# Patient Record
Sex: FEMALE | Race: WHITE | Employment: FULL TIME | URBAN - METROPOLITAN AREA
[De-identification: names, ages, dates, MRNs, and addresses within clinical notes are randomized per-mention and may not be internally consistent; named-entity substitution may affect disease eponyms.]

---

## 2019-08-29 ENCOUNTER — AMBULATORY - HEALTHEAST (OUTPATIENT)
Dept: CARDIOLOGY | Facility: CLINIC | Age: 59
End: 2019-08-29

## 2021-01-29 ENCOUNTER — IMMUNIZATION (OUTPATIENT)
Dept: PEDIATRICS | Facility: CLINIC | Age: 61
End: 2021-01-29
Payer: COMMERCIAL

## 2021-01-29 PROCEDURE — 91300 PR COVID VAC PFIZER DIL RECON 30 MCG/0.3 ML IM: CPT

## 2021-01-29 PROCEDURE — 0001A PR COVID VAC PFIZER DIL RECON 30 MCG/0.3 ML IM: CPT

## 2021-02-19 ENCOUNTER — IMMUNIZATION (OUTPATIENT)
Dept: PEDIATRICS | Facility: CLINIC | Age: 61
End: 2021-02-19
Attending: INTERNAL MEDICINE
Payer: COMMERCIAL

## 2021-02-19 PROCEDURE — 91300 PR COVID VAC PFIZER DIL RECON 30 MCG/0.3 ML IM: CPT

## 2021-02-19 PROCEDURE — 0002A PR COVID VAC PFIZER DIL RECON 30 MCG/0.3 ML IM: CPT

## 2021-03-07 ENCOUNTER — HEALTH MAINTENANCE LETTER (OUTPATIENT)
Age: 61
End: 2021-03-07

## 2021-10-11 ENCOUNTER — HEALTH MAINTENANCE LETTER (OUTPATIENT)
Age: 61
End: 2021-10-11

## 2022-03-27 ENCOUNTER — HEALTH MAINTENANCE LETTER (OUTPATIENT)
Age: 62
End: 2022-03-27

## 2022-09-25 ENCOUNTER — HEALTH MAINTENANCE LETTER (OUTPATIENT)
Age: 62
End: 2022-09-25

## 2023-05-08 ENCOUNTER — HEALTH MAINTENANCE LETTER (OUTPATIENT)
Age: 63
End: 2023-05-08

## 2024-03-20 ENCOUNTER — TRANSCRIBE ORDERS (OUTPATIENT)
Dept: OTHER | Age: 64
End: 2024-03-20

## 2024-03-20 DIAGNOSIS — M75.00 ADHESIVE CAPSULITIS OF SHOULDER: Primary | ICD-10-CM

## 2024-03-20 DIAGNOSIS — M75.101 ROTATOR CUFF TEAR, RIGHT: ICD-10-CM

## 2024-04-18 ASSESSMENT — ACTIVITIES OF DAILY LIVING (ADL)
WHEN_LYING_ON_THE_INVOLVED_SIDE: 8
CARRYING_A_HEAVY_OBJECT_OF_10_POUNDS: 8
REMOVING_SOMETHING_FROM_YOUR_BACK_POCKET: 7
AT_ITS_WORST?: 9
PUTTING_ON_YOUR_PANTS: 0
PLEASE_INDICATE_YOR_PRIMARY_REASON_FOR_REFERRAL_TO_THERAPY:: SHOULDER
PUTTING_ON_AN_UNDERSHIRT_OR_A_PULLOVER_SWEATER: 7
PLACING_AN_OBJECT_ON_A_HIGH_SHELF: 10
WASHING_YOUR_BACK: 9
TOUCHING_THE_BACK_OF_YOUR_NECK: 1
PUSHING_WITH_THE_INVOLVED_ARM: 7
REACHING_FOR_SOMETHING_ON_A_HIGH_SHELF: 10
WASHING_YOUR_HAIR?: 8
PUTTING_ON_A_SHIRT_THAT_BUTTONS_DOWN_THE_FRONT: 7

## 2024-04-19 ENCOUNTER — THERAPY VISIT (OUTPATIENT)
Dept: PHYSICAL THERAPY | Facility: CLINIC | Age: 64
End: 2024-04-19
Payer: COMMERCIAL

## 2024-04-19 DIAGNOSIS — M75.00 ADHESIVE CAPSULITIS OF SHOULDER: ICD-10-CM

## 2024-04-19 DIAGNOSIS — M25.511 ACUTE PAIN OF RIGHT SHOULDER: Primary | ICD-10-CM

## 2024-04-19 DIAGNOSIS — M75.101 ROTATOR CUFF TEAR, RIGHT: ICD-10-CM

## 2024-04-19 PROCEDURE — 97110 THERAPEUTIC EXERCISES: CPT | Mod: GP | Performed by: PHYSICAL THERAPIST

## 2024-04-19 PROCEDURE — 97161 PT EVAL LOW COMPLEX 20 MIN: CPT | Mod: GP | Performed by: PHYSICAL THERAPIST

## 2024-04-19 NOTE — PROGRESS NOTES
PHYSICAL THERAPY EVALUATION  Type of Visit: Evaluation    See electronic medical record for Abuse and Falls Screening details.    Subjective       Presenting condition or subjective complaint: pt reports that she fell in January 19, 2024 and she hurt her shoulder with her arm outstretched. She went to see Dr. Hansen who recommended an MRI and wants her to go to PT for a frozen shoulder.  Date of onset:      Relevant medical history: Stroke; Heart problems; Menopause; Overweight; Pain at night or rest; Sleep disorder like apnea; Vision problems   Dates & types of surgery: hysterectomy in her 40's    Prior diagnostic imaging/testing results: MRI; X-ray; Other     Prior therapy history for the same diagnosis, illness or injury: Yes Jan 2024 after a fall on 1/19/24        Living Environment  Social support: With a significant other or spouse   Type of home: House   Stairs to enter the home: Yes 2 Is there a railing: No   Ramp: No   Stairs inside the home: Yes 21 Is there a railing: Yes   Help at home:    Equipment owned:       Employment: Yes Dental hygienist  Hobbies/Interests: Golf, gardening, pickle ball,hiking,fishing,cross country skiing,    Patient goals for therapy: Reach far, sleep, pain is always there, hold my grandchildren    Pain assessment: See objective evaluation for additional pain details     Objective   Pain:   Location: anterior, lateral, UT and goes down to the elbow and does get some burning into forearm  At rest: 5/10  With activity: 10/10  Quality: dull ache, sharp,   Frequency: Intermittent sharp and constant ache  Time of Day: worse in the night  Pain is exacerbated by: reaching overhead, behind back, lying on shoulder and lifting, cleaning, working.  Pain is relieved by: rest, ice, anti-inflammatories massage    Range of Motion:     AROM L shoulder: 165/168/T4/88     AROM R shoulder: 121/94/T10/54  Strength:  L shoulder: WNL    R shoulder: not tested due to pain with AROM today  Notable  mechanics: R scapular medial border prominence, winging and upward translation with abduction.   Special Tests: NT  Palpation: Tension and hypertonicity noted at R UT, LS, rhomboids  Posture: Fwd head, rounded shoulders and elevated scapula on R    Cervical repeated motions:   Repeated retraction: no effect during or after.           Repeated retraction w/ OP: no effect during, slight reduced pain in anterior shoulder and arm but felt more in chest and down her esophagus.    Repeated retraction w/ EXT: no effect during, and centralization out of R arm to shoulder and UT.        Assessment & Plan   CLINICAL IMPRESSIONS  Medical Diagnosis:      Treatment Diagnosis:     Impression/Assessment: Patient is a 64 year old female with R shoudler complaints.  The following significant findings have been identified: Pain, Decreased ROM/flexibility, Decreased joint mobility, and Decreased strength. These impairments interfere with their ability to perform self care tasks, work tasks, recreational activities, household chores, and driving  as compared to previous level of function.     Clinical Decision Making (Complexity):  Clinical Presentation: Stable/Uncomplicated  Clinical Presentation Rationale: based on medical and personal factors listed in PT evaluation  Clinical Decision Making (Complexity): Low complexity    PLAN OF CARE  Treatment Interventions:  Interventions: Manual Therapy, Neuromuscular Re-education, Therapeutic Activity, Therapeutic Exercise    Long Term Goals            Frequency of Treatment:    Duration of Treatment:      Recommended Referrals to Other Professionals: Physical Therapy  Education Assessment:        Risks and benefits of evaluation/treatment have been explained.   Patient/Family/caregiver agrees with Plan of Care.     Evaluation Time:             Signing Clinician: Lisseth Shaw, PT

## 2024-05-10 ENCOUNTER — THERAPY VISIT (OUTPATIENT)
Dept: PHYSICAL THERAPY | Facility: CLINIC | Age: 64
End: 2024-05-10
Payer: COMMERCIAL

## 2024-05-10 DIAGNOSIS — M25.511 ACUTE PAIN OF RIGHT SHOULDER: Primary | ICD-10-CM

## 2024-05-10 PROCEDURE — 97140 MANUAL THERAPY 1/> REGIONS: CPT | Mod: GP | Performed by: PHYSICAL THERAPIST

## 2024-05-10 PROCEDURE — 97110 THERAPEUTIC EXERCISES: CPT | Mod: GP | Performed by: PHYSICAL THERAPIST

## 2024-05-11 ENCOUNTER — HEALTH MAINTENANCE LETTER (OUTPATIENT)
Age: 64
End: 2024-05-11

## 2024-05-17 ENCOUNTER — THERAPY VISIT (OUTPATIENT)
Dept: PHYSICAL THERAPY | Facility: CLINIC | Age: 64
End: 2024-05-17
Payer: COMMERCIAL

## 2024-05-17 DIAGNOSIS — M25.511 ACUTE PAIN OF RIGHT SHOULDER: Primary | ICD-10-CM

## 2024-05-17 PROCEDURE — 97140 MANUAL THERAPY 1/> REGIONS: CPT | Mod: GP | Performed by: PHYSICAL THERAPIST

## 2024-05-17 PROCEDURE — 97110 THERAPEUTIC EXERCISES: CPT | Mod: GP | Performed by: PHYSICAL THERAPIST

## 2024-05-17 NOTE — PROGRESS NOTES
PLAN  Continue therapy per current plan of care.   05/17/24 0500   Appointment Info   Signing clinician's name / credentials Declan Gamez DPT   Total/Authorized Visits 12 ([facility])   Visits Used 3   Medical Diagnosis R shoulder adhesive capsulitis and RCT   PT Tx Diagnosis R shoulder pain and stiffness   Other pertinent information can't do an injection yet but she wants to wait until after her eye surgery.   Progress Note/Certification   Onset of illness/injury or Date of Surgery 01/19/24   Therapy Frequency 1x/wk for 4 weeks then every other week for 8 weeks   Predicted Duration 12 weeks   Progress Note Due Date 06/14/24   PT Goal 1   Goal Identifier reaching   Goal Description improve AROM and strength to allow pt to reach to top shelf of [place] cabinet without hiking shoulder   Rationale to maximize safety and independence within the home;to maximize safety and independence with performance of ADLs and functional tasks   Goal Progress see ROM section below   Target Date 07/12/24   Subjective Report   Subjective Report Neck has been sore/R side of neck. It's affecting her day, notes that she did not have neck pain prior to starting PT .The arm/shoulder overall is better, less severe pain and better movement. Overall about 20% improved since starting PT.   Objective Measures   Objective Measures Objective Measure 1   Objective Measure 1   Objective Measure R shoulder AAROM:   Details wall mpbjlzx=503, ABD=133, EXT=49   Treatment Interventions (PT)   Interventions Therapeutic Procedure/Exercise;Therapeutic Activity;Manual Therapy;Neuromuscular Re-education   Therapeutic Procedure/Exercise   Therapeutic Procedures: strength, endurance, ROM, flexibility minutes (73446) 25   PTRx Ther Proc 1 Cervical Retraction With Patient Overpressure   PTRx Ther Proc 1 - Details x10   PTRx Ther Proc 2 Seated Cervical Retraction Extension With Overpressure   PTRx Ther Proc 2 - Details 2-3 reps for review=too painful for work  "  PTRx Ther Proc 3 Pendulum/Codmans   PTRx Ther Proc 3 - Details No Notes   PTRx Ther Proc 4 Four Corner Stretch Flexion   PTRx Ther Proc 4 - Details 15   PTRx Ther Proc 5 Wand Shoulder Abduction Standing   PTRx Ther Proc 5 - Details 15   PTRx Ther Proc 6 Wand Shoulder Extension Standing   PTRx Ther Proc 6 - Details 15   PTRx Ther Proc 7 Four Corner Stretch External Rotation at Side   PTRx Ther Proc 7 - Details 15 x 5 sec multiple cues to hold for 5-10 sec   Skilled Intervention cuing for good form   Patient Response/Progress alfredito well, see above   Ther Proc 1 UBE warm up 4'   PTRx Ther Proc 8 Four Corner Stretch Internal Rotation   PTRx Ther Proc 8 - Details 15   Therapeutic Activity   Therapeutic Activities: dynamic activities to improve functional performance minutes (48992) 5   Manual Therapy   Manual Therapy: Mobilization, MFR, MLD, friction massage minutes (90647) 10   Manual Therapy 1 seated TFM to R UT   Patient Response/Progress felt better and was a little \"tickly\" after   Plan   Home program see ptrx   Updates to plan of care cervical retraction with self overpressure, education on improtance of sustaining the stretches for 5-10 sec   Plan for next session per resposne to cervical retraction, supine strength as able,   Total Session Time   Timed Code Treatment Minutes 40   Total Treatment Time (sum of timed and untimed services) 40         Beginning/End Dates of Progress Note Reporting Period:    to 05/17/2024    Referring Provider:  Everardo Hansen    "

## 2024-05-24 ENCOUNTER — THERAPY VISIT (OUTPATIENT)
Dept: PHYSICAL THERAPY | Facility: CLINIC | Age: 64
End: 2024-05-24
Payer: COMMERCIAL

## 2024-05-24 DIAGNOSIS — M25.511 ACUTE PAIN OF RIGHT SHOULDER: Primary | ICD-10-CM

## 2024-05-24 PROCEDURE — 97110 THERAPEUTIC EXERCISES: CPT | Mod: GP | Performed by: PHYSICAL THERAPIST

## 2024-05-24 PROCEDURE — 97140 MANUAL THERAPY 1/> REGIONS: CPT | Mod: GP | Performed by: PHYSICAL THERAPIST

## 2024-05-31 ENCOUNTER — THERAPY VISIT (OUTPATIENT)
Dept: PHYSICAL THERAPY | Facility: CLINIC | Age: 64
End: 2024-05-31
Payer: COMMERCIAL

## 2024-05-31 DIAGNOSIS — M25.511 ACUTE PAIN OF RIGHT SHOULDER: Primary | ICD-10-CM

## 2024-05-31 PROCEDURE — 97140 MANUAL THERAPY 1/> REGIONS: CPT | Mod: GP | Performed by: PHYSICAL THERAPIST

## 2024-05-31 PROCEDURE — 97110 THERAPEUTIC EXERCISES: CPT | Mod: GP | Performed by: PHYSICAL THERAPIST

## 2024-06-21 ENCOUNTER — THERAPY VISIT (OUTPATIENT)
Dept: PHYSICAL THERAPY | Facility: CLINIC | Age: 64
End: 2024-06-21
Payer: COMMERCIAL

## 2024-06-21 DIAGNOSIS — M25.511 ACUTE PAIN OF RIGHT SHOULDER: Primary | ICD-10-CM

## 2024-06-21 PROCEDURE — 97140 MANUAL THERAPY 1/> REGIONS: CPT | Mod: GP | Performed by: PHYSICAL THERAPIST

## 2024-06-21 PROCEDURE — 97110 THERAPEUTIC EXERCISES: CPT | Mod: GP | Performed by: PHYSICAL THERAPIST

## 2024-07-19 ENCOUNTER — THERAPY VISIT (OUTPATIENT)
Dept: PHYSICAL THERAPY | Facility: CLINIC | Age: 64
End: 2024-07-19
Payer: COMMERCIAL

## 2024-07-19 DIAGNOSIS — M25.511 ACUTE PAIN OF RIGHT SHOULDER: Primary | ICD-10-CM

## 2024-07-19 PROCEDURE — 97110 THERAPEUTIC EXERCISES: CPT | Mod: GP | Performed by: PHYSICAL THERAPIST

## 2024-07-19 PROCEDURE — 97112 NEUROMUSCULAR REEDUCATION: CPT | Mod: GP | Performed by: PHYSICAL THERAPIST

## 2024-10-11 ENCOUNTER — THERAPY VISIT (OUTPATIENT)
Dept: PHYSICAL THERAPY | Facility: CLINIC | Age: 64
End: 2024-10-11
Payer: COMMERCIAL

## 2024-10-11 DIAGNOSIS — M25.511 ACUTE PAIN OF RIGHT SHOULDER: Primary | ICD-10-CM

## 2024-10-11 PROCEDURE — 97110 THERAPEUTIC EXERCISES: CPT | Mod: GP | Performed by: PHYSICAL THERAPIST

## 2024-10-11 PROCEDURE — 97112 NEUROMUSCULAR REEDUCATION: CPT | Mod: GP | Performed by: PHYSICAL THERAPIST

## 2024-10-11 NOTE — PROGRESS NOTES
10/11/24 0500   Appointment Info   Signing clinician's name / credentials Lisseth Mosley DPT, SCS   Total/Authorized Visits 12 ([facility])   Visits Used 8   Medical Diagnosis R shoulder adhesive capsulitis and RCT   PT Tx Diagnosis R shoulder pain and stiffness   Precautions/Limitations doesn't have MD appt bc she needs eye surgery first--will have second eye on 6-10 then has f/u with eye doc at the end of July   Other pertinent information can't do an injection yet but she wants to wait until after her eye surgery.   Progress Note/Certification   Onset of illness/injury or Date of Surgery 01/19/24   Therapy Frequency 1x/wk for 4 weeks then every other week for 8 weeks   Predicted Duration 12 weeks   Progress Note Due Date 06/14/24   Progress Note Completed Date 10/11/24   PT Goal 1   Goal Identifier reaching   Goal Description improve AROM and strength to allow pt to reach to top shelf of [place] cabinet without hiking shoulder   Rationale to maximize safety and independence within the home;to maximize safety and independence with performance of ADLs and functional tasks   Goal Progress can reach to top shelf without UT hiking   Target Date 07/12/24   Date Met 10/11/24   Subjective Report   Subjective Report She saw Dr. Hansen since our last visit, and he recommended more PT since she is a little bit limited with ROM at end ranges and that it may continue. He recommended another visit or two with PT and to progress with some weights and learn a routine for home.   Objective Measures   Objective Measures Objective Measure 1;Objective Measure 2   Objective Measure 1   Objective Measure R shoulder AAROM:   Details wall szuxctm=107, ABD=150   Objective Measure 2   Objective Measure R shoulder AROM: 142/152/T10/68   Details L shoulder AROM: 161/165/T7/77   Treatment Interventions (PT)   Interventions Therapeutic Procedure/Exercise;Therapeutic Activity;Manual Therapy;Neuromuscular Re-education   Therapeutic  Procedure/Exercise   Therapeutic Procedures: strength, endurance, ROM, flexibility minutes (06809) 23   PTRx Ther Proc 1 Cervical Retraction With Patient Overpressure   PTRx Ther Proc 1 - Details x10   PTRx Ther Proc 2 Pendulum/Codmans   PTRx Ther Proc 2 - Details No Notes   PTRx Ther Proc 3 Four Corner Stretch Flexion   PTRx Ther Proc 3 - Details 15   PTRx Ther Proc 4 Wand Shoulder Abduction Standing   PTRx Ther Proc 4 - Details 15   PTRx Ther Proc 5 Wand Shoulder Extension Standing   PTRx Ther Proc 5 - Details 15   PTRx Ther Proc 6 Four Corner Stretch External Rotation at Side   PTRx Ther Proc 6 - Details 15 x 5 sec multiple cues to hold for 5-10 sec   PTRx Ther Proc 7 Four Corner Stretch Internal Rotation   PTRx Ther Proc 7 - Details 15   PTRx Ther Proc 8 Shoulder Flexion   PTRx Ther Proc 8 - Details 6 oz x 15 with back on wall   PTRx Ther Proc 9 Shoulder Theraband Internal Rotation   PTRx Ther Proc 9 - Details 15 with BTB   PTRx Ther Proc 10 Shoulder Scaption Full Can   PTRx Ther Proc 10 - Details 6 oz x 15 with back on wall    PTRx Ther Proc 11 Shoulder External Rotation Sidelying   PTRx Ther Proc 11 - Details 6 oz x 15   Skilled Intervention cuing for good form   Patient Response/Progress alfredito well, see above   PTRx Ther Proc 12 Bicep Curls with Dumbbells   PTRx Ther Proc 12 - Details 5# x15   PTRx Ther Proc 13 Bent Over Rows with Dumbbell   PTRx Ther Proc 13 - Details 8 with 10#   Neuromuscular Re-education   Neuromuscular re-ed of mvmt, balance, coord, kinesthetic sense, posture, proprioception minutes (89533) 10   PTRx Neuro Re-ed 1 Nerve Gliding Distal Ulnar    PTRx Neuro Re-ed 1 - Details rev   PTRx Neuro Re-ed 2 Shoulder Theraband Low Row/[place]   PTRx Neuro Re-ed 2 - Details 15   PTRx Neuro Re-ed 3 Shoulder Extension in Standing   PTRx Neuro Re-ed 3 - Details 15 with 1#   PTRx Neuro Re-ed 4 Push-Up Plus At Counter   PTRx Neuro Re-ed 4 - Details 15 with NMR    Skilled Intervention cuing not to hike UT  or protrude chin   Patient Response/Progress alfredito well, no issues   Plan   Home program see ptrx   Updates to plan of care added bicep curl and bent over row, rev HEP and discussed activity vs strength training   Plan for next session will continue with HEP independently for 1 month, she will f/u at that time and if needed will see one more visit for progressive strengthening and scapular stability along with proprioceptive exercises   Total Session Time   Timed Code Treatment Minutes 33   Total Treatment Time (sum of timed and untimed services) 33       PLAN  Continue therapy per current plan of care.    Beginning/End Dates of Progress Note Reporting Period:  04/19/2024 to 10/11/2024    Referring Provider:  Everardo Hansen

## 2025-01-07 PROBLEM — M25.511 ACUTE PAIN OF RIGHT SHOULDER: Status: RESOLVED | Noted: 2024-04-19 | Resolved: 2025-01-07

## 2025-05-17 ENCOUNTER — HEALTH MAINTENANCE LETTER (OUTPATIENT)
Age: 65
End: 2025-05-17

## 2025-08-28 ENCOUNTER — THERAPY VISIT (OUTPATIENT)
Dept: PHYSICAL THERAPY | Facility: CLINIC | Age: 65
End: 2025-08-28
Payer: COMMERCIAL

## 2025-08-28 DIAGNOSIS — H81.10 BENIGN PAROXYSMAL POSITIONAL VERTIGO, UNSPECIFIED LATERALITY: ICD-10-CM

## 2025-08-28 DIAGNOSIS — R42 DIZZINESS: Primary | ICD-10-CM

## 2025-09-02 ENCOUNTER — THERAPY VISIT (OUTPATIENT)
Age: 65
End: 2025-09-02
Payer: COMMERCIAL

## 2025-09-02 DIAGNOSIS — H81.10 BPPV (BENIGN PAROXYSMAL POSITIONAL VERTIGO), UNSPECIFIED LATERALITY: ICD-10-CM

## 2025-09-02 DIAGNOSIS — H81.11 BENIGN PAROXYSMAL POSITIONAL VERTIGO, RIGHT: Primary | ICD-10-CM

## 2025-09-02 PROCEDURE — 95992 CANALITH REPOSITIONING PROC: CPT | Mod: GP | Performed by: PHYSICAL THERAPIST
